# Patient Record
Sex: FEMALE | Race: WHITE | NOT HISPANIC OR LATINO | Employment: PART TIME | URBAN - NONMETROPOLITAN AREA
[De-identification: names, ages, dates, MRNs, and addresses within clinical notes are randomized per-mention and may not be internally consistent; named-entity substitution may affect disease eponyms.]

---
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
## 2023-05-03 ENCOUNTER — TELEPHONE (OUTPATIENT)
Dept: OBSTETRICS AND GYNECOLOGY | Facility: CLINIC | Age: 18
End: 2023-05-03

## 2023-05-03 DIAGNOSIS — R19.4 BOWEL HABIT CHANGES: Primary | ICD-10-CM

## 2023-05-03 NOTE — TELEPHONE ENCOUNTER
Caller: Bryant Radhika    Relationship: Self    Best call back number: [phone]    What is the medical concern/diagnosis: IBS    What specialty or service is being requested:GASTROLOIST    What is the provider, practice or medical service name: DENISE ASHFORD//[facility]    What is the office location: [address] [place] [address] [address]    What is the office phone number: [phone]    Any additional details: WAS DIAGNOSED WITH IBS IN 2020 LAST TIME SEEN HER PROVIDER WAS 2021 AND HAS MOVED AWAY FROM THAT PROVIDER AND NEEDS A LOCAL PROVIDER TO GET HER MEDICATION REFILLED//HAS AN APPOINTMENT 6/14- THE OFFICE STATES THAT SHE NEEDS A REFERRAL TO KEEP THIS APPOINTMENT//PLEASE FOLLOW UP IF ANY ISSUES

## 2023-05-09 ENCOUNTER — OFFICE VISIT (OUTPATIENT)
Dept: GASTROENTEROLOGY | Facility: CLINIC | Age: 18
End: 2023-05-09

## 2023-05-09 VITALS
DIASTOLIC BLOOD PRESSURE: 76 MMHG | OXYGEN SATURATION: 98 % | HEART RATE: 116 BPM | BODY MASS INDEX: 37.69 KG/M2 | SYSTOLIC BLOOD PRESSURE: 112 MMHG | WEIGHT: 192 LBS | HEIGHT: 60 IN | TEMPERATURE: 97.3 F

## 2023-05-09 DIAGNOSIS — K58.1 IRRITABLE BOWEL SYNDROME WITH CONSTIPATION: Primary | ICD-10-CM

## 2023-05-09 PROBLEM — R10.9 ABDOMINAL PAIN: Status: ACTIVE | Noted: 2023-05-09

## 2023-05-09 RX ORDER — DICYCLOMINE HYDROCHLORIDE 10 MG/1
10 CAPSULE ORAL
Qty: 360 CAPSULE | Refills: 3 | Status: SHIPPED | OUTPATIENT
Start: 2023-05-09

## 2023-05-09 NOTE — PROGRESS NOTES
Primary Physician: Remy Odell MD    Chief Complaint   Patient presents with   • Irritable Bowel Syndrome     Pt presents today to establish with GI-was seeing [facility] because she was a pediatric pt; Pt has been on Bentyl and states that does keep her IBS under control for the most part-does have to take more Bentyl in the summer because the heat seems to bother her IBS; Pt would like refill on the Bentyl        Subjective     Radhika Bryant is a 17 y.o. female.    HPI   IBS  Patient referred to our office for purposes of establishing herself as a new pt with our GI office for irritable bowel syndrome.  Previously followed by [facility].  Chart review revealed notes that she has been complaining of abdominal pain suspected to be irritable bowel since at least September 2020.  It was at that time she was recommended fiber therapy and began on Bentyl 10 mg.  This has worked well for her.  At age 15 she was diagnosed wtih IBS predominant constipation associated with cramping.  No family hx IBD. No family hx colon cancer.    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    Today, she describes her IBS is stable.  Feels like her symptoms are worse during summer. Using Bentyl 10mg before meals and at bedtime.  No blood per rectum. No weight loss. No abdominal pain. No previous colonoscopy exams.      Past Medical History:   Diagnosis Date   • Asthma    • IBS (irritable bowel syndrome)        Past Surgical History:   Procedure Laterality Date   • WISDOM TOOTH EXTRACTION          Current Outpatient Medications:   •  dicyclomine (BENTYL) 10 MG capsule, Take 1 capsule by mouth 4 (Four) Times a Day Before Meals & at Bedtime., Disp: 360 capsule, Rfl: 3  •  levonorgestrel (Mirena, 52 MG,) 20 MCG/24HR IUD, 1 each by Intrauterine route 1 (One) Time for 1 dose., Disp: 1 each, Rfl: 0  •  Wheat Dextrin (BENEFIBER PO), Take 1 capsule by mouth As Needed., Disp: , Rfl:     No  "Known Allergies    Social History     Socioeconomic History   • Marital status:    Tobacco Use   • Smoking status: Never   • Smokeless tobacco: Never   Vaping Use   • Vaping Use: Never used   Substance and Sexual Activity   • Alcohol use: Never   • Drug use: Never   • Sexual activity: Yes     Partners: Male     Birth control/protection: I.U.D.       Family History   Problem Relation Age of Onset   • Diabetes Father    • Irritable bowel syndrome Father    • Diabetes Maternal Grandmother    • Colon cancer Neg Hx    • Colon polyps Neg Hx    • Esophageal cancer Neg Hx    • Liver cancer Neg Hx    • Stomach cancer Neg Hx    • Rectal cancer Neg Hx    • Liver disease Neg Hx        Review of Systems   Constitutional: Negative for unexpected weight change.   Gastrointestinal: Negative for abdominal pain.       Objective     /76 (BP Location: Left arm, Patient Position: Sitting, Cuff Size: Adult)   Pulse 116   Temp 97.3 °F (36.3 °C) (Infrared)   Ht 152.4 cm (60\")   Wt 87.1 kg (192 lb)   SpO2 98%   Breastfeeding No   BMI 37.50 kg/m²     Physical Exam  Vitals reviewed.   Constitutional:       Appearance: Normal appearance.   Neurological:      Mental Status: She is alert.             Lab Results - Last 18 Months   Lab Units 10/12/22  1110   TSH uIU/mL 1.430              IMPRESSION/PLAN:    Assessment & Plan      Problem List Items Addressed This Visit        Gastrointestinal Abdominal     Irritable bowel syndrome with constipation - Primary    Relevant Medications    dicyclomine (BENTYL) 10 MG capsule     Follow up yearly  Continue Bentyl and fiber therapy        Follow Up 1 yearly      Alivia Sanchez, APRN  05/09/23  14:33 CDT    Part of this note may be an electronic transcription/translation of spoken language to printed text.    "

## 2023-10-16 ENCOUNTER — OFFICE VISIT (OUTPATIENT)
Dept: OBSTETRICS AND GYNECOLOGY | Facility: CLINIC | Age: 18
End: 2023-10-16

## 2023-10-16 VITALS
SYSTOLIC BLOOD PRESSURE: 102 MMHG | WEIGHT: 199 LBS | BODY MASS INDEX: 39.07 KG/M2 | DIASTOLIC BLOOD PRESSURE: 76 MMHG | HEIGHT: 60 IN

## 2023-10-16 DIAGNOSIS — N92.6 MISSED PERIOD: Primary | ICD-10-CM

## 2023-10-16 DIAGNOSIS — Z11.3 SCREENING FOR STD (SEXUALLY TRANSMITTED DISEASE): ICD-10-CM

## 2023-10-16 NOTE — PROGRESS NOTES
Chief Complaint  Med Management (Pt is here for a yearly birth control follow up.  Pt states that she has had periods the whole time she has had her mirena but is now 11 days late for this period and has had some ovary pain.  //Mirena was placed 2/16/22.  )    Subjective          Radhika Bryant presents to [facility]    History of Present Illness  The patient's pregnancy test is negative.  She states that it has been the first time that she missed her menstrual cycle since having the intrauterine device for 2 years.  When she is on her menstrual cycle, she will usually have just spotting for a few days.  Since she had the intrauterine device, she usually will have spotting for 3 days and will have heavy bleeding for the next 3 days.  She only needed to wear liners for those 3 days.  She mentions that her sister was recently diagnosed with hypothyroidism.  She has been more worried of having severe cramps than missing her menstrual cycle.  At one point, she had severe pain in her entire stomach.  She denies having intense cramping with her menstrual cycle.  When she is ovulating, she notes that she will feel some type of ovarian pain.  She reports that the pain this time is more amplified.  She agrees to undergo an ultrasound to look at her uterus and ovaries.  She agrees to do basic sexually transmitted disease testing.    The patient denies having any problems with vaginal itching, odor, dryness, or discharge.    In the last year, the patient has gained 24 pounds.  She reports that she has been working from home until 08/2023.  She notes that she has not been moving as much, which she feels has contributed to her weight gain.    Review of Systems   Constitutional:  Negative for activity change, appetite change, fatigue and fever.        Wt gain     Respiratory:  Negative for apnea and shortness of breath.    Cardiovascular:  Negative for chest pain and palpitations.   Gastrointestinal:  Negative  "for abdominal distention, abdominal pain, constipation, diarrhea, nausea and vomiting.   Endocrine: Negative for cold intolerance and heat intolerance.   Genitourinary:  Positive for menstrual problem (cramping). Negative for difficulty urinating, frequency, pelvic pain, vaginal discharge and vaginal pain.   Neurological:  Negative for headaches.   Psychiatric/Behavioral:  Negative for agitation and sleep disturbance.          Objective   Vital Signs:   /76   Ht 152.4 cm (60\")   Wt 90.3 kg (199 lb)   BMI 38.86 kg/m²     Physical Exam  Vitals reviewed.   Constitutional:       Appearance: She is well-developed.   Eyes:      General:         Right eye: No discharge.         Left eye: No discharge.   Cardiovascular:      Rate and Rhythm: Normal rate and regular rhythm.   Pulmonary:      Effort: Pulmonary effort is normal.      Breath sounds: Normal breath sounds.   Skin:     General: Skin is warm.   Neurological:      Mental Status: She is alert and oriented to person, place, and time.   Psychiatric:         Behavior: Behavior normal.         Thought Content: Thought content normal.         Judgment: Judgment normal.         Result Review :                       Assessment and Plan      Discussed pt bleeding change.   UPT negative  Labs to evaluate Free T4, TSH and CBC.     Urine sent for Chlamydia/Gonorrhea/Trichomonas testing.     Pt to return for US and OV.       Diagnoses and all orders for this visit:    1. Missed period (Primary)  -     POC Pregnancy, Urine  -     T4, Free  -     TSH  -     CBC & Differential    2. Screening for STD (sexually transmitted disease)  -     Chlamydia trachomatis, Neisseria gonorrhoeae, Trichomonas vaginalis, PCR - Urine, Urine, Clean Catch                  Follow Up   Return for US and OV.    Patient was given instructions and counseling regarding her condition or for health maintenance advice. Please see specific information pulled into the AVS if appropriate. "     Transcribed from ambient dictation for DENISE Waddell by Vlad Rizzo.  10/16/23   15:39 CDT    Patient or patient representative verbalized consent to the visit recording.  I have personally performed the services described in this document as transcribed by the above individual, and it is both accurate and complete.

## 2023-10-17 LAB
BASOPHILS # BLD AUTO: 0.03 10*3/MM3 (ref 0–0.2)
BASOPHILS NFR BLD AUTO: 0.6 % (ref 0–1.5)
EOSINOPHIL # BLD AUTO: 0.16 10*3/MM3 (ref 0–0.4)
EOSINOPHIL NFR BLD AUTO: 3.2 % (ref 0.3–6.2)
ERYTHROCYTE [DISTWIDTH] IN BLOOD BY AUTOMATED COUNT: 11.8 % (ref 12.3–15.4)
HCT VFR BLD AUTO: 41.5 % (ref 34–46.6)
HGB BLD-MCNC: 14 G/DL (ref 12–15.9)
IMM GRANULOCYTES # BLD AUTO: 0.01 10*3/MM3 (ref 0–0.05)
IMM GRANULOCYTES NFR BLD AUTO: 0.2 % (ref 0–0.5)
LYMPHOCYTES # BLD AUTO: 1.82 10*3/MM3 (ref 0.7–3.1)
LYMPHOCYTES NFR BLD AUTO: 36 % (ref 19.6–45.3)
MCH RBC QN AUTO: 28.2 PG (ref 26.6–33)
MCHC RBC AUTO-ENTMCNC: 33.7 G/DL (ref 31.5–35.7)
MCV RBC AUTO: 83.5 FL (ref 79–97)
MONOCYTES # BLD AUTO: 0.48 10*3/MM3 (ref 0.1–0.9)
MONOCYTES NFR BLD AUTO: 9.5 % (ref 5–12)
NEUTROPHILS # BLD AUTO: 2.56 10*3/MM3 (ref 1.7–7)
NEUTROPHILS NFR BLD AUTO: 50.5 % (ref 42.7–76)
NRBC BLD AUTO-RTO: 0 /100 WBC (ref 0–0.2)
PLATELET # BLD AUTO: 348 10*3/MM3 (ref 140–450)
RBC # BLD AUTO: 4.97 10*6/MM3 (ref 3.77–5.28)
T4 FREE SERPL-MCNC: 1.11 NG/DL (ref 0.93–1.7)
TSH SERPL DL<=0.005 MIU/L-ACNC: 1.6 UIU/ML (ref 0.27–4.2)
WBC # BLD AUTO: 5.06 10*3/MM3 (ref 3.4–10.8)

## 2023-10-18 ENCOUNTER — OFFICE VISIT (OUTPATIENT)
Dept: OBSTETRICS AND GYNECOLOGY | Facility: CLINIC | Age: 18
End: 2023-10-18

## 2023-10-18 VITALS
HEIGHT: 60 IN | WEIGHT: 197 LBS | BODY MASS INDEX: 38.68 KG/M2 | DIASTOLIC BLOOD PRESSURE: 80 MMHG | SYSTOLIC BLOOD PRESSURE: 124 MMHG

## 2023-10-18 DIAGNOSIS — Z30.49 ENCOUNTER FOR SURVEILLANCE OF OTHER CONTRACEPTIVE: Primary | ICD-10-CM

## 2023-10-18 DIAGNOSIS — E66.9 OBESITY (BMI 30-39.9): ICD-10-CM

## 2023-10-18 DIAGNOSIS — N92.6 MISSED PERIOD: ICD-10-CM

## 2023-10-18 LAB
C TRACH RRNA SPEC QL NAA+PROBE: NEGATIVE
N GONORRHOEA RRNA SPEC QL NAA+PROBE: NEGATIVE
T VAGINALIS RRNA SPEC QL NAA+PROBE: NEGATIVE

## 2023-10-18 NOTE — PATIENT INSTRUCTIONS

## 2023-10-18 NOTE — PROGRESS NOTES
"Chief Complaint  Gynecologic Exam (Pt is here for a f/u with US to check IUD placement and check ovaries.  Pt has no other complaints today. )    Subjective          Radhika Bryant presents to [facility]    History of Present Illness  The patient presents for concern of late menstruation.  Her previous pregnancy test was negative.    Review of Systems   Constitutional:  Negative for activity change, appetite change, fatigue and fever.   Respiratory:  Negative for apnea and shortness of breath.    Cardiovascular:  Negative for chest pain and palpitations.   Gastrointestinal:  Negative for abdominal distention, abdominal pain, constipation, diarrhea, nausea and vomiting.   Endocrine: Negative for cold intolerance and heat intolerance.   Genitourinary:  Positive for menstrual problem (late period). Negative for difficulty urinating, frequency, pelvic pain, vaginal discharge and vaginal pain.   Neurological:  Negative for headaches.   Psychiatric/Behavioral:  Negative for agitation and sleep disturbance.          Objective   Vital Signs:   /80   Ht 152.4 cm (60\")   Wt 89.4 kg (197 lb)   BMI 38.47 kg/m²     Physical Exam  Vitals reviewed.   Constitutional:       Appearance: She is well-developed.   Eyes:      General:         Right eye: No discharge.         Left eye: No discharge.   Cardiovascular:      Rate and Rhythm: Normal rate and regular rhythm.   Pulmonary:      Effort: Pulmonary effort is normal.      Breath sounds: Normal breath sounds.   Skin:     General: Skin is warm.   Neurological:      Mental Status: She is alert and oriented to person, place, and time.   Psychiatric:         Behavior: Behavior normal.         Thought Content: Thought content normal.         Judgment: Judgment normal.         Result Review :   The following data was reviewed by: DENISE Waddell on 10/18/2023:  OBGYN Diagnostics Review:   HCG, Urine, QL   Date Value Ref Range Status   10/16/2023 " Negative Negative Final      Chlamydia trachomatis, NURIS   Date Value Ref Range Status   10/16/2023 Negative Negative Final     Gonococcus by NURIS   Date Value Ref Range Status   10/16/2023 Negative Negative Final      TSH          10/16/2023    10:16   TSH   TSH 1.600      Data reviewed : Radiologic studies transvaginal US                 Assessment and Plan      Reviewed US report and discussed with pt.   US report indicates 5.82 cm, ovaries appear normal, IUD appears normal.     Discussed pt test results.   Pt advised to call with any questions or concerns.   Discussed S&S to report. Pt voiced understanding.       Diagnoses and all orders for this visit:    1. Encounter for surveillance of other contraceptive (Primary)    2. Obesity (BMI 30-39.9)    3. Missed period                  Follow Up   Return for Annual physical.    Patient was given instructions and counseling regarding her condition or for health maintenance advice. Please see specific information pulled into the AVS if appropriate.     Transcribed from ambient dictation for DENISE Waddell by Vlad Rizzo.  10/18/23   14:20 CDT    Patient or patient representative verbalized consent to the visit recording.  I have personally performed the services described in this document as transcribed by the above individual, and it is both accurate and complete.

## 2023-10-20 NOTE — PATIENT INSTRUCTIONS

## 2023-11-04 ENCOUNTER — HOSPITAL ENCOUNTER (EMERGENCY)
Facility: HOSPITAL | Age: 18
Discharge: HOME | End: 2023-11-04

## 2023-11-04 VITALS
DIASTOLIC BLOOD PRESSURE: 83 MMHG | TEMPERATURE: 98.5 F | HEART RATE: 117 BPM | OXYGEN SATURATION: 97 % | WEIGHT: 190 LBS | BODY MASS INDEX: 37.3 KG/M2 | SYSTOLIC BLOOD PRESSURE: 143 MMHG | HEIGHT: 60 IN | RESPIRATION RATE: 20 BRPM

## 2023-11-04 DIAGNOSIS — L03.213 PRESEPTAL CELLULITIS OF RIGHT EYE: Primary | ICD-10-CM

## 2023-11-04 PROCEDURE — 96372 THER/PROPH/DIAG INJ SC/IM: CPT

## 2023-11-04 PROCEDURE — 25010000002 DEXAMETHASONE PER 1 MG

## 2023-11-04 PROCEDURE — 63710000001 DIPHENHYDRAMINE PER 50 MG

## 2023-11-04 PROCEDURE — 99283 EMERGENCY DEPT VISIT LOW MDM: CPT

## 2023-11-04 RX ORDER — DEXAMETHASONE 4 MG/1
4 TABLET ORAL 3 TIMES DAILY
Qty: 9 TABLET | Refills: 0 | Status: SHIPPED | OUTPATIENT
Start: 2023-11-04 | End: 2023-11-07

## 2023-11-04 RX ORDER — AMOXICILLIN AND CLAVULANATE POTASSIUM 875; 125 MG/1; MG/1
1 TABLET, FILM COATED ORAL 2 TIMES DAILY
Qty: 14 TABLET | Refills: 0 | Status: SHIPPED | OUTPATIENT
Start: 2023-11-04 | End: 2023-11-11

## 2023-11-04 RX ORDER — DIPHENHYDRAMINE HCL 25 MG
25 CAPSULE ORAL ONCE
Status: COMPLETED | OUTPATIENT
Start: 2023-11-04 | End: 2023-11-04

## 2023-11-04 RX ORDER — DEXAMETHASONE SODIUM PHOSPHATE 10 MG/ML
10 INJECTION INTRAMUSCULAR; INTRAVENOUS ONCE
Status: COMPLETED | OUTPATIENT
Start: 2023-11-04 | End: 2023-11-04

## 2023-11-04 RX ADMIN — DIPHENHYDRAMINE HYDROCHLORIDE 25 MG: 25 CAPSULE ORAL at 01:04

## 2023-11-04 RX ADMIN — DEXAMETHASONE SODIUM PHOSPHATE 10 MG: 10 INJECTION INTRAMUSCULAR; INTRAVENOUS at 01:05

## 2023-11-04 NOTE — DISCHARGE INSTRUCTIONS
Today you are seen in the ER for your symptoms.  This is likely preseptal cellulitis.  Antibiotics, steroids and antibiotic/steroid ointment were prescribed.  Please follow-up with primary care provider as soon as possible reassessments.  Please follow-up with ophthalmology as well.  Please return the emergency department for any new or worsening symptoms like we discussed such as pain with movement or worsening swelling.

## 2023-11-04 NOTE — ED PROVIDER NOTES
Subjective   History of Present Illness  Patient is a 18-year-old female who presents emergency department with complaints of eye problem.  States that she was asleep and she woke up at her right eye was itchy and watery.  She went to the bathroom and noticed that her right eye was swollen.  She denies any injury to her eye.  Denies any fevers recently.  She states that she is able to see, but it is blurry because it is watery.  She denies any pain to her eye.        Review of Systems   HENT:  Positive for facial swelling.    Eyes:  Positive for discharge, redness and itching.   All other systems reviewed and are negative.      Past Medical History:   Diagnosis Date    Asthma     IBS (irritable bowel syndrome)        No Known Allergies    Past Surgical History:   Procedure Laterality Date    WISDOM TOOTH EXTRACTION         Family History   Problem Relation Age of Onset    Diabetes Father     Irritable bowel syndrome Father     Diabetes Maternal Grandmother     Diabetes Paternal Grandmother     Diabetes Paternal Uncle     Diabetes Paternal Uncle     Colon cancer Neg Hx     Colon polyps Neg Hx     Esophageal cancer Neg Hx     Liver cancer Neg Hx     Stomach cancer Neg Hx     Rectal cancer Neg Hx     Liver disease Neg Hx        Social History     Socioeconomic History    Marital status:    Tobacco Use    Smoking status: Never    Smokeless tobacco: Never   Vaping Use    Vaping Use: Never used   Substance and Sexual Activity    Alcohol use: Never    Drug use: Never    Sexual activity: Yes     Partners: Male     Birth control/protection: I.U.D.           Objective   Physical Exam  Vitals and nursing note reviewed.   Constitutional:       General: She is not in acute distress.     Appearance: Normal appearance. She is normal weight. She is not ill-appearing or toxic-appearing.   HENT:      Head: Normocephalic.      Nose: Nose normal.   Eyes:      General:         Right eye: Discharge (Watery) present.       Extraocular Movements: Extraocular movements intact.      Conjunctiva/sclera: Conjunctivae normal.      Pupils: Pupils are equal, round, and reactive to light.      Comments: Redness noted to right sclera.  There is swelling noted around the right eye.   Cardiovascular:      Rate and Rhythm: Normal rate and regular rhythm.      Pulses: Normal pulses.      Heart sounds: Normal heart sounds.   Pulmonary:      Effort: Pulmonary effort is normal.      Breath sounds: Normal breath sounds.   Abdominal:      General: Abdomen is flat. Bowel sounds are normal.      Palpations: Abdomen is soft.   Musculoskeletal:         General: Normal range of motion.      Cervical back: Normal range of motion and neck supple.   Skin:     General: Skin is warm and dry.   Neurological:      General: No focal deficit present.      Mental Status: She is alert and oriented to person, place, and time. Mental status is at baseline.   Psychiatric:         Mood and Affect: Mood normal.         Behavior: Behavior normal.         Thought Content: Thought content normal.         Judgment: Judgment normal.         Procedures           ED Course                                           Medical Decision Making  Radhika Bryant is a very pleasant 18 y.o. female who presents to the emergency department for eye problem.     Patient was non-toxic and not-ill appearing on arrival. Vital signs stable.     Patient's presentation raises suspicion for differentials including, but not limited to, preseptal cellulitis, conjunctivitis, corneal abrasion.     External (non-ED) record review: None    Radhika was given IM Decadron and Benadryl for symptomatic relief.  Patient passed visual acuity screen.    On re-evaluation, patient remained hemodynamically stable and appeared to be comfortable and in no acute distress.    Given findings described above, patient's presentation is most likely related to preseptal cellulitis.  Steroids, antibiotics, TobraDex ointment were  prescribed at discharge.  Advised patient to watch for worsening signs such as worsening swelling, eye pain with movement, vision changes.  Advised patient to follow-up with ophthalmology as soon as possible to reassess symptoms. I answered all the questions regarding the emergency department evaluation, diagnosis, and treatment plan. We talked about how crucial it is for the patient to follow up by calling their primary care provider as soon as possible to schedule an appointment for within the next few days or as soon as possible so that the symptoms can be reassessed to see if they have improved or to answer any additional questions. I also provided the patient with advice on returning safely and urged the patient to visit the emergency department right away if any worsening or new symptoms appeared. The patient verbalized understanding of the discharge instructions and agreed with them. Radhika was discharged in stable condition.    Signed by:   DENISE Gipson 11/4/2023 01:04 CDT     Dragon disclaimer:  Part of this note may be an electronic transcription/translation of spoken language to printed text using the Dragon Dictation System.    Problems Addressed:  Preseptal cellulitis of right eye: complicated acute illness or injury    Risk  Prescription drug management.        Final diagnoses:   Preseptal cellulitis of right eye       ED Disposition  ED Disposition       ED Disposition   Discharge    Condition   Stable    Comment   --               Remy Odell MD  [address] [place] [address] [id number]  [facility] [address]  [phone]    Schedule an appointment as soon as possible for a visit in 1 day      [facility] EMERGENCY DEPARTMENT  [address] [place] [id number] [phone]  Go to   If symptoms worsen    Gaurav Dick MD  [address] [facility] [address]  [phone]    Schedule an appointment as soon as possible for a visit in 1 day           Medication List         New Prescriptions      amoxicillin-clavulanate 875-125 MG per tablet  Commonly known as: AUGMENTIN  Take 1 tablet by mouth 2 (Two) Times a Day for 7 days.     dexAMETHasone 4 MG tablet  Commonly known as: DECADRON  Take 1 tablet by mouth 3 (Three) Times a Day for 3 days.     TobraDex 0.3-0.1 % ophthalmic ointment  Generic drug: tobramycin-dexAMETHasone  Administer  to the right eye 3 (Three) Times a Day.               Where to Get Your Medications        These medications were sent to [facility] - [place], [place] - [address] [place] [address] - [phone]  - [phone] [address] [address]      Phone: [phone]   amoxicillin-clavulanate 875-125 MG per tablet  dexAMETHasone 4 MG tablet  TobraDex 0.3-0.1 % ophthalmic ointment            Morenita Givens, DENISE  11/04/23 0106

## 2023-11-06 ENCOUNTER — NURSE TRIAGE (OUTPATIENT)
Dept: CALL CENTER | Facility: HOSPITAL | Age: 18
End: 2023-11-06

## 2023-11-06 NOTE — TELEPHONE ENCOUNTER
"Caller needing to speak with Billing department re: financial assistance for ED visit 11/04/2023.     Call transferred to [facility] billing department.    Reason for Disposition   Information only question and nurse able to answer    Additional Information   Negative: Nursing judgment   Negative: Nursing judgment   Negative: Nursing judgment   Negative: Nursing judgment    Answer Assessment - Initial Assessment Questions  1. REASON FOR CALL or QUESTION: \"What is your reason for calling today?\" or \"How can I best help you?\" or \"What question do you have that I can help answer?\"      Needs to speak with someone regarding financial assistance for ED visit on 11/04/2023    Protocols used: No Protocol Available - Information Only-ADULT-[place]    "

## 2024-02-28 ENCOUNTER — OFFICE VISIT (OUTPATIENT)
Dept: OBSTETRICS AND GYNECOLOGY | Age: 19
End: 2024-02-28
Payer: MEDICAID

## 2024-02-28 VITALS
BODY MASS INDEX: 41.23 KG/M2 | SYSTOLIC BLOOD PRESSURE: 124 MMHG | WEIGHT: 210 LBS | HEIGHT: 60 IN | DIASTOLIC BLOOD PRESSURE: 70 MMHG

## 2024-02-28 DIAGNOSIS — N92.6 MISSED PERIOD: Primary | ICD-10-CM

## 2024-02-28 DIAGNOSIS — L73.9 HAIR FOLLICLE INFECTION: ICD-10-CM

## 2024-02-28 PROCEDURE — 99214 OFFICE O/P EST MOD 30 MIN: CPT | Performed by: NURSE PRACTITIONER

## 2024-02-28 PROCEDURE — 99459 PELVIC EXAMINATION: CPT | Performed by: NURSE PRACTITIONER

## 2024-02-28 PROCEDURE — 81025 URINE PREGNANCY TEST: CPT | Performed by: NURSE PRACTITIONER

## 2024-02-28 RX ORDER — SULFAMETHOXAZOLE AND TRIMETHOPRIM 800; 160 MG/1; MG/1
1 TABLET ORAL 2 TIMES DAILY
Qty: 14 TABLET | Refills: 0 | Status: SHIPPED | OUTPATIENT
Start: 2024-02-28

## 2024-03-01 NOTE — PROGRESS NOTES
Chief Complaint   Patient presents with    vaginal lump     Patient here for vaginal lump. Patient reports lump being on the left side. Patient reports noticing lump on Sunday night.     Menstrual Problem     Patient is late on her period for this month. She reports having normal periods usually even with the IUD. Patient reports taking a pregnancy test 2 weeks ago and it was negative.        History:  Radhika Bryant is a 18 y.o. female who presents today for follow-up for evaluation of the above:  Pt comes in today with complaints of bump on left labia. Symptoms have improved. First noticed on Sunday.   Also had another late period with IUD. She has had this checked into previously but wanted to make sure it was okay.         ROS:  Review of Systems   Constitutional:  Negative for activity change and unexpected weight loss.   Cardiovascular:  Negative for chest pain.   Gastrointestinal:  Negative for blood in stool, constipation and diarrhea.   Endocrine: Negative for cold intolerance and heat intolerance.   Genitourinary:  Positive for menstrual problem. Negative for dyspareunia, pelvic pain and vaginal discharge.   Musculoskeletal:  Negative for arthralgias, back pain, neck pain and neck stiffness.   Skin:  Positive for skin lesions. Negative for rash.   Neurological:  Negative for dizziness and headache.   Psychiatric/Behavioral:  Negative for sleep disturbance. The patient is not nervous/anxious.        Ms. Bryant  reports that she has never smoked. She has never used smokeless tobacco. She reports that she does not drink alcohol and does not use drugs.      Current Outpatient Medications:     dicyclomine (BENTYL) 10 MG capsule, Take 1 capsule by mouth 4 (Four) Times a Day Before Meals & at Bedtime., Disp: 360 capsule, Rfl: 3    levonorgestrel (Mirena, 52 MG,) 20 MCG/24HR IUD, 1 each by Intrauterine route 1 (One) Time for 1 dose., Disp: 1 each, Rfl: 0    Wheat Dextrin (BENEFIBER PO), Take 1 capsule by mouth As  "Needed., Disp: , Rfl:     sulfamethoxazole-trimethoprim (Bactrim DS) 800-160 MG per tablet, Take 1 tablet by mouth 2 (Two) Times a Day., Disp: 14 tablet, Rfl: 0      OBJECTIVE:  /70 (BP Location: Left arm, Patient Position: Sitting, Cuff Size: Adult)   Ht 152.4 cm (60\")   Wt 95.3 kg (210 lb)   LMP 01/01/2024   BMI 41.01 kg/m²    Physical Exam  Vitals and nursing note reviewed. Exam conducted with a chaperone present.   Constitutional:       Appearance: She is well-developed.   HENT:      Head: Normocephalic and atraumatic.   Cardiovascular:      Rate and Rhythm: Normal rate and regular rhythm.   Abdominal:      General: Bowel sounds are normal. There is no distension.      Palpations: Abdomen is soft.      Tenderness: There is no abdominal tenderness.      Hernia: There is no hernia in the left inguinal area.   Genitourinary:     Labia:         Right: No rash, tenderness or lesion.         Left: Tenderness and lesion present. No rash.        Skin:     General: Skin is warm and dry.   Neurological:      Mental Status: She is alert and oriented to person, place, and time.   Psychiatric:         Behavior: Behavior normal.         Thought Content: Thought content normal.         Judgment: Judgment normal.         Assessment/Plan    Diagnoses and all orders for this visit:    1. Missed period (Primary)  -     POC Pregnancy, Urine    2. Hair follicle infection  -     sulfamethoxazole-trimethoprim (Bactrim DS) 800-160 MG per tablet; Take 1 tablet by mouth 2 (Two) Times a Day.  Dispense: 14 tablet; Refill: 0    Pt comes in today with complaints of bump on left labia that started over weekend. It was getting bigger, but now she states it has improved.  On exam, this is more suspicious for resolving infected hair follicle. No significant induration noted. Minimal erythema. No drainage. Will cover as above. Pt to return with continuing or worsening symptoms.  Pt also complains of late period again. This has been " going on with her IUD. She does understand that this can be normal, but it always makes her nervous. UPT was negative. Pt has had ultrasound in recent past for same thing that showed normal placement. Pt currently declines another ultrasound. Return with worsening symptoms.        An After Visit Summary was printed and given to the patient at discharge.  Return for Next scheduled follow up. Sooner if problems arise.          DENISE Shaver  Electronically Signed

## 2024-07-17 ENCOUNTER — OFFICE VISIT (OUTPATIENT)
Dept: GASTROENTEROLOGY | Facility: CLINIC | Age: 19
End: 2024-07-17

## 2024-07-17 VITALS
SYSTOLIC BLOOD PRESSURE: 122 MMHG | TEMPERATURE: 97.4 F | BODY MASS INDEX: 42.41 KG/M2 | DIASTOLIC BLOOD PRESSURE: 72 MMHG | OXYGEN SATURATION: 100 % | HEART RATE: 110 BPM | WEIGHT: 216 LBS | HEIGHT: 60 IN

## 2024-07-17 DIAGNOSIS — K21.9 GASTROESOPHAGEAL REFLUX DISEASE, UNSPECIFIED WHETHER ESOPHAGITIS PRESENT: ICD-10-CM

## 2024-07-17 DIAGNOSIS — K58.1 IRRITABLE BOWEL SYNDROME WITH CONSTIPATION: ICD-10-CM

## 2024-07-17 DIAGNOSIS — R11.0 NAUSEA: Primary | ICD-10-CM

## 2024-07-17 RX ORDER — DICYCLOMINE HYDROCHLORIDE 10 MG/1
10 CAPSULE ORAL
Qty: 360 CAPSULE | Refills: 3 | Status: SHIPPED | OUTPATIENT
Start: 2024-07-17

## 2024-07-17 RX ORDER — AZELASTINE HYDROCHLORIDE, FLUTICASONE PROPIONATE 137; 50 UG/1; UG/1
1 SPRAY, METERED NASAL DAILY
COMMUNITY
Start: 2024-03-01

## 2024-07-17 RX ORDER — OMEPRAZOLE 40 MG/1
40 CAPSULE, DELAYED RELEASE ORAL DAILY
Qty: 30 CAPSULE | Refills: 1 | Status: SHIPPED | OUTPATIENT
Start: 2024-07-17

## 2024-07-17 RX ORDER — FEXOFENADINE HCL 180 MG/1
180 TABLET ORAL DAILY
COMMUNITY
Start: 2024-03-01

## 2024-07-17 NOTE — PROGRESS NOTES
Primary Physician: Provider, No Known    Chief Complaint   Patient presents with    Follow-up     Pt presents today for yearly OV for IBS-states she doesn't feel like her Bentyl is working anymore-is having a lot of nausea in the mornings and a lot of abdominal cramping for the last 2 months-also states she's had an increase in reflux       Subjective     Radhika Bryant is a 19 y.o. female.    HPI  IBS  Patient initially seen May 2023 for IBS.  Previously followed by [facility].  Patient was taking Bentyl before meals and at bedtime.  No previous colonoscopy examination.    She comes in today with complaints of slight change in bowel habits.  She may be a bit more constipated.  Again she is using Bentyl regularly daily.  Usually does have a BM daily.    Nausea  Having frequent nausea. Worse in the morning hours.  It has been persistent for past couple months.  She does have the Mirena IUD for birth control and she took pregnancy test last month which was negative.  No NSAIDS.    GERD  Increased acid reflux a couple weeks ago. At this time is has subsided.  No dysphagia.  No melena.      Past Medical History:   Diagnosis Date    Asthma     IBS (irritable bowel syndrome)        Past Surgical History:   Procedure Laterality Date    WISDOM TOOTH EXTRACTION          Current Outpatient Medications:     Azelastine-Fluticasone 137-50 MCG/ACT suspension, Inhale 1 spray Daily., Disp: , Rfl:     dicyclomine (BENTYL) 10 MG capsule, Take 1 capsule by mouth 4 (Four) Times a Day Before Meals & at Bedtime., Disp: 360 capsule, Rfl: 3    fexofenadine (Allegra Allergy) 180 MG tablet, Take 1 tablet by mouth Daily., Disp: , Rfl:     levonorgestrel (Mirena, 52 MG,) 20 MCG/24HR IUD, 1 each by Intrauterine route 1 (One) Time for 1 dose., Disp: 1 each, Rfl: 0    Wheat Dextrin (BENEFIBER PO), Take 1 capsule by mouth As Needed., Disp: , Rfl:     omeprazole (priLOSEC) 40 MG capsule, Take 1 capsule by mouth Daily., Disp: 30 capsule,  "Rfl: 1    sulfamethoxazole-trimethoprim (Bactrim DS) 800-160 MG per tablet, Take 1 tablet by mouth 2 (Two) Times a Day. (Patient not taking: Reported on 7/17/2024), Disp: 14 tablet, Rfl: 0    No Known Allergies    Social History     Socioeconomic History    Marital status:    Tobacco Use    Smoking status: Never    Smokeless tobacco: Never   Vaping Use    Vaping status: Never Used   Substance and Sexual Activity    Alcohol use: Never    Drug use: Never    Sexual activity: Yes     Partners: Male     Birth control/protection: I.U.D.       Family History   Problem Relation Age of Onset    Diabetes Father     Irritable bowel syndrome Father     Diabetes Maternal Grandmother     Diabetes Paternal Grandmother     Diabetes Paternal Uncle     Diabetes Paternal Uncle     Colon cancer Neg Hx     Colon polyps Neg Hx     Esophageal cancer Neg Hx     Liver cancer Neg Hx     Stomach cancer Neg Hx     Rectal cancer Neg Hx     Liver disease Neg Hx        Review of Systems   Constitutional:  Negative for unexpected weight change.   Gastrointestinal:  Positive for constipation and nausea.       Objective     /72 (BP Location: Left arm, Patient Position: Sitting, Cuff Size: Adult)   Pulse 110   Temp 97.4 °F (36.3 °C) (Infrared)   Ht 152.4 cm (60\")   Wt 98 kg (216 lb)   SpO2 100%   Breastfeeding No   BMI 42.18 kg/m²     Physical Exam  Vitals reviewed.   Constitutional:       Appearance: Normal appearance.   Cardiovascular:      Rate and Rhythm: Normal rate and regular rhythm.      Heart sounds: Normal heart sounds.   Pulmonary:      Effort: Pulmonary effort is normal.      Breath sounds: Normal breath sounds.   Neurological:      Mental Status: She is alert.           Lab Results - Last 18 Months   Lab Units 10/16/23  1016   HEMOGLOBIN g/dL 14.0   HEMATOCRIT % 41.5   MCV fL 83.5   WBC 10*3/mm3 5.06   RDW % 11.8*   PLATELETS 10*3/mm3 348       Lab Results - Last 18 Months   Lab Units 10/16/23  1016   TSH uIU/mL 1.600 "              IMPRESSION/PLAN:    Assessment & Plan      Problem List Items Addressed This Visit       Irritable bowel syndrome with constipation    Overview     Diagnosed in [facility] prior to seeing me May 2023.  No previous colonoscopy.  Self-pay patient.  Treated with Bentyl before meals         Relevant Medications    dicyclomine (BENTYL) 10 MG capsule    GERD (gastroesophageal reflux disease)    Overview     Increased acid reflux over the past month but the past 2 weeks she has been asymptomatic         Current Assessment & Plan     Adding omeprazole 40 mg daily  ..Pt is instructed to avoid caffeine, chocolate, peppermint and nicotine.  They are to avoid eating within 2-3 hours prior to bedtime.           Relevant Medications    omeprazole (priLOSEC) 40 MG capsule    dicyclomine (BENTYL) 10 MG capsule    Nausea - Primary    Overview     Nausea worse morning hours.  No NSAIDs.         Current Assessment & Plan     Begin omeprazole 40 mg once daily.  Beginning conservatively since she does not have insurance.  Otherwise I would like to consider endoscopy examination if we cannot get her symptoms under control.          6 weeks of Omeprazole  6-week follow-up                  Alivia Sanchez, APRN  07/18/24  15:24 CDT    Part of this note may be an electronic transcription/translation of spoken language to printed text.

## 2024-07-17 NOTE — ASSESSMENT & PLAN NOTE
Adding omeprazole 40 mg daily  ..Pt is instructed to avoid caffeine, chocolate, peppermint and nicotine.  They are to avoid eating within 2-3 hours prior to bedtime.

## 2024-07-18 NOTE — ASSESSMENT & PLAN NOTE
Begin omeprazole 40 mg once daily.  Beginning conservatively since she does not have insurance.  Otherwise I would like to consider endoscopy examination if we cannot get her symptoms under control.

## 2024-08-13 DIAGNOSIS — R11.0 NAUSEA: Primary | ICD-10-CM

## 2024-08-13 DIAGNOSIS — K21.9 GASTROESOPHAGEAL REFLUX DISEASE, UNSPECIFIED WHETHER ESOPHAGITIS PRESENT: ICD-10-CM

## 2024-08-19 ENCOUNTER — TELEPHONE (OUTPATIENT)
Dept: GASTROENTEROLOGY | Facility: CLINIC | Age: 19
End: 2024-08-19

## 2024-08-21 ENCOUNTER — HOSPITAL ENCOUNTER (OUTPATIENT)
Facility: HOSPITAL | Age: 19
Setting detail: HOSPITAL OUTPATIENT SURGERY
Discharge: HOME | End: 2024-08-21
Attending: INTERNAL MEDICINE | Admitting: INTERNAL MEDICINE

## 2024-08-21 ENCOUNTER — ANESTHESIA (OUTPATIENT)
Dept: GASTROENTEROLOGY | Facility: HOSPITAL | Age: 19
End: 2024-08-21

## 2024-08-21 ENCOUNTER — ANESTHESIA EVENT (OUTPATIENT)
Dept: GASTROENTEROLOGY | Facility: HOSPITAL | Age: 19
End: 2024-08-21

## 2024-08-21 VITALS
BODY MASS INDEX: 42.41 KG/M2 | HEART RATE: 93 BPM | TEMPERATURE: 97.4 F | SYSTOLIC BLOOD PRESSURE: 102 MMHG | DIASTOLIC BLOOD PRESSURE: 72 MMHG | HEIGHT: 60 IN | OXYGEN SATURATION: 95 % | RESPIRATION RATE: 21 BRPM | WEIGHT: 216 LBS

## 2024-08-21 DIAGNOSIS — K21.9 GASTROESOPHAGEAL REFLUX DISEASE, UNSPECIFIED WHETHER ESOPHAGITIS PRESENT: ICD-10-CM

## 2024-08-21 DIAGNOSIS — R11.0 NAUSEA: ICD-10-CM

## 2024-08-21 DIAGNOSIS — R10.13 EPIGASTRIC PAIN: Primary | ICD-10-CM

## 2024-08-21 LAB — B-HCG UR QL: NEGATIVE

## 2024-08-21 PROCEDURE — 25010000002 PROPOFOL 10 MG/ML EMULSION

## 2024-08-21 PROCEDURE — 25810000003 SODIUM CHLORIDE 0.9 % SOLUTION: Performed by: ANESTHESIOLOGY

## 2024-08-21 PROCEDURE — 43235 EGD DIAGNOSTIC BRUSH WASH: CPT | Performed by: INTERNAL MEDICINE

## 2024-08-21 PROCEDURE — 81025 URINE PREGNANCY TEST: CPT | Performed by: ANESTHESIOLOGY

## 2024-08-21 RX ORDER — PROPOFOL 10 MG/ML
VIAL (ML) INTRAVENOUS AS NEEDED
Status: DISCONTINUED | OUTPATIENT
Start: 2024-08-21 | End: 2024-08-21 | Stop reason: SURG

## 2024-08-21 RX ORDER — LIDOCAINE HYDROCHLORIDE 10 MG/ML
0.5 INJECTION, SOLUTION EPIDURAL; INFILTRATION; INTRACAUDAL; PERINEURAL ONCE AS NEEDED
Status: CANCELLED | OUTPATIENT
Start: 2024-08-21

## 2024-08-21 RX ORDER — LIDOCAINE HYDROCHLORIDE 20 MG/ML
INJECTION, SOLUTION EPIDURAL; INFILTRATION; INTRACAUDAL; PERINEURAL AS NEEDED
Status: DISCONTINUED | OUTPATIENT
Start: 2024-08-21 | End: 2024-08-21 | Stop reason: SURG

## 2024-08-21 RX ORDER — SODIUM CHLORIDE 9 MG/ML
500 INJECTION, SOLUTION INTRAVENOUS CONTINUOUS PRN
Status: DISCONTINUED | OUTPATIENT
Start: 2024-08-21 | End: 2024-08-21 | Stop reason: HOSPADM

## 2024-08-21 RX ORDER — SODIUM CHLORIDE 0.9 % (FLUSH) 0.9 %
10 SYRINGE (ML) INJECTION AS NEEDED
Status: DISCONTINUED | OUTPATIENT
Start: 2024-08-21 | End: 2024-08-21 | Stop reason: HOSPADM

## 2024-08-21 RX ADMIN — GLYCOPYRROLATE 0.1 MG: 0.2 INJECTION INTRAMUSCULAR; INTRAVENOUS at 11:02

## 2024-08-21 RX ADMIN — PROPOFOL 350 MG: 10 INJECTION, EMULSION INTRAVENOUS at 11:04

## 2024-08-21 RX ADMIN — SODIUM CHLORIDE 500 ML: 9 INJECTION, SOLUTION INTRAVENOUS at 10:43

## 2024-08-21 RX ADMIN — LIDOCAINE HYDROCHLORIDE 100 MG: 20 INJECTION, SOLUTION EPIDURAL; INFILTRATION; INTRACAUDAL; PERINEURAL at 11:04

## 2024-08-21 NOTE — ANESTHESIA POSTPROCEDURE EVALUATION
"Patient: Radhika Bryant    Procedure Summary       Date: 08/21/24 Room / Location: [facility] ENDOSCOPY 2 / [facility] ENDOSCOPY    Anesthesia Start: 1101 Anesthesia Stop: 1114    Procedure: ESOPHAGOGASTRODUODENOSCOPY WITH ANESTHESIA Diagnosis:       Nausea      Gastroesophageal reflux disease, unspecified whether esophagitis present      (Nausea [R11.0])      (Gastroesophageal reflux disease, unspecified whether esophagitis present [K21.9])    Surgeons: Lisa Rosales MD Provider: Brooklyn Wiggins CRNA    Anesthesia Type: MAC ASA Status: 3            Anesthesia Type: MAC    Vitals  Vitals Value Taken Time   /72 08/21/24 1131   Temp     Pulse 94 08/21/24 1135   Resp 21 08/21/24 1130   SpO2 93 % 08/21/24 1135   Vitals shown include unfiled device data.        Post Anesthesia Care and Evaluation    Patient location during evaluation: PACU  Patient participation: complete - patient participated  Level of consciousness: awake and awake and alert  Pain score: 0  Pain management: adequate    Airway patency: patent  Anesthetic complications: No anesthetic complications  PONV Status: none  Cardiovascular status: acceptable  Respiratory status: acceptable  Hydration status: acceptable    Comments: Patient discharged according to acceptable Tejal score per RN assessment. See nursing records for further information.     Blood pressure 102/72, pulse 93, temperature 97.4 °F (36.3 °C), temperature source Temporal, resp. rate 21, height 152.4 cm (60\"), weight 98 kg (216 lb), SpO2 95%, not currently breastfeeding.      "

## 2024-08-21 NOTE — ANESTHESIA PREPROCEDURE EVALUATION
Anesthesia Evaluation     Patient summary reviewed   no history of anesthetic complications:   NPO Solid Status: > 8 hours             Airway   Mallampati: II  Dental      Pulmonary    (+) asthma,  Cardiovascular - negative cardio ROS  Exercise tolerance: excellent (>7 METS)        Neuro/Psych- negative ROS  GI/Hepatic/Renal/Endo    (+) morbid obesity, GERD    Musculoskeletal     Abdominal    Substance History      OB/GYN          Other                    Anesthesia Plan    ASA 3     MAC       Anesthetic plan, risks, benefits, and alternatives have been provided, discussed and informed consent has been obtained with: patient.    CODE STATUS:

## 2024-08-21 NOTE — DISCHARGE INSTRUCTIONS
THE HOSPITAL SCHEDULING DEPARTMENT WILL NOTIFY PT WITH APPOINTMENT DATE AND TIME FOR A SONO OF THE GALLBLADDER AND HIDA SCAN

## 2024-08-21 NOTE — H&P
Chief Complaint:   Nausea    Subjective     HPI:   She has complaints of nausea that has not improved with the addition of Prilosec 40 mg daily.  GBIS.  Previously followed by another GI practice and has been told that she has IBS.    Past Medical History:   Past Medical History:   Diagnosis Date    Asthma     IBS (irritable bowel syndrome)        Past Surgical History:  Past Surgical History:   Procedure Laterality Date    WISDOM TOOTH EXTRACTION         Family History:  Family History   Problem Relation Age of Onset    Diabetes Father     Irritable bowel syndrome Father     Diabetes Maternal Grandmother     Diabetes Paternal Grandmother     Diabetes Paternal Uncle     Diabetes Paternal Uncle     Colon cancer Neg Hx     Colon polyps Neg Hx     Esophageal cancer Neg Hx     Liver cancer Neg Hx     Stomach cancer Neg Hx     Rectal cancer Neg Hx     Liver disease Neg Hx        Social History:   reports that she has never smoked. She has never used smokeless tobacco. She reports that she does not drink alcohol and does not use drugs.    Medications:   Medications Prior to Admission   Medication Sig Dispense Refill Last Dose    Azelastine-Fluticasone 137-50 MCG/ACT suspension Inhale 1 spray Daily.   8/20/2024    dicyclomine (BENTYL) 10 MG capsule Take 1 capsule by mouth 4 (Four) Times a Day Before Meals & at Bedtime. 360 capsule 3 8/20/2024    fexofenadine (Allegra Allergy) 180 MG tablet Take 1 tablet by mouth Daily.   8/20/2024    levonorgestrel (Mirena, 52 MG,) 20 MCG/24HR IUD 1 each by Intrauterine route 1 (One) Time for 1 dose. 1 each 0 8/21/2024    omeprazole (priLOSEC) 40 MG capsule Take 1 capsule by mouth Daily. 30 capsule 1 8/20/2024    sulfamethoxazole-trimethoprim (Bactrim DS) 800-160 MG per tablet Take 1 tablet by mouth 2 (Two) Times a Day. (Patient not taking: Reported on 7/17/2024) 14 tablet 0     Wheat Dextrin (BENEFIBER PO) Take 1 capsule by mouth As Needed.   8/14/2024       Allergies:  Patient has no  "known allergies.    ROS:    Resp: No SOA  Cardiovascular: No CP      Objective     /85 (Patient Position: Sitting)   Pulse 112   Temp 97.4 °F (36.3 °C) (Temporal)   Resp 18   Ht 152.4 cm (60\")   Wt 98 kg (216 lb)   SpO2 99%   BMI 42.18 kg/m²     Physical Exam   Constitutional: Pt is oriented to person, place, and in no distress.   Pulmonary/Chest: No distress.  No audible wheezes  Psychiatric: Mood, memory, affect and judgment appear normal.     Assessment & Plan     Diagnosis:  Nausea    Anticipated Surgical Procedure:  Endoscopy    The risks, benefits, and alternatives of endoscopy were reviewed with the patient today.  Risks including perforation, with or without dilation, possibly requiring surgery.  Additional risks include risk of bleeding.  There is also the risk of a drug reaction or problems with anesthesia.  This will be discussed with the further by the anesthesia team on the day of the procedure. The benefits include the diagnosis and management of disease of the upper digestive tract.  Alternatives to endoscopy include upper GI series, laboratory testing, radiographic evaluation, or no intervention.  The patient verbalizes understanding and agrees.    Please note that portions of this note were completed with a voice recognition program.       "

## 2024-08-30 ENCOUNTER — HOSPITAL ENCOUNTER (OUTPATIENT)
Dept: ULTRASOUND IMAGING | Facility: HOSPITAL | Age: 19
Discharge: HOME | End: 2024-08-30
Admitting: INTERNAL MEDICINE

## 2024-08-30 DIAGNOSIS — R11.0 NAUSEA: ICD-10-CM

## 2024-08-30 DIAGNOSIS — R10.13 EPIGASTRIC PAIN: ICD-10-CM

## 2024-08-30 PROBLEM — K76.0 FATTY LIVER: Status: ACTIVE | Noted: 2024-08-30

## 2024-08-30 PROCEDURE — 76705 ECHO EXAM OF ABDOMEN: CPT

## 2024-09-13 ENCOUNTER — HOSPITAL ENCOUNTER (OUTPATIENT)
Dept: NUCLEAR MEDICINE | Facility: HOSPITAL | Age: 19
Discharge: HOME | End: 2024-09-13

## 2024-09-13 DIAGNOSIS — R10.13 EPIGASTRIC PAIN: ICD-10-CM

## 2024-09-13 DIAGNOSIS — R11.0 NAUSEA: ICD-10-CM

## 2024-09-13 PROCEDURE — 78226 HEPATOBILIARY SYSTEM IMAGING: CPT

## 2024-09-13 PROCEDURE — A9537 TC99M MEBROFENIN: HCPCS | Performed by: INTERNAL MEDICINE

## 2024-09-13 PROCEDURE — 0 TECHNETIUM TC 99M MEBROFENIN KIT: Performed by: INTERNAL MEDICINE

## 2024-09-13 RX ORDER — KIT FOR THE PREPARATION OF TECHNETIUM TC 99M MEBROFENIN 45 MG/10ML
1 INJECTION, POWDER, LYOPHILIZED, FOR SOLUTION INTRAVENOUS
Status: COMPLETED | OUTPATIENT
Start: 2024-09-13 | End: 2024-09-13

## 2024-09-13 RX ADMIN — MEBROFENIN 1 DOSE: 45 INJECTION, POWDER, LYOPHILIZED, FOR SOLUTION INTRAVENOUS at 08:45

## (undated) DEVICE — YANKAUER,BULB TIP WITH VENT: Brand: ARGYLE

## (undated) DEVICE — THE SINGLE USE ETRAP – POLYP TRAP IS USED FOR SUCTION RETRIEVAL OF ENDOSCOPICALLY REMOVED POLYPS.: Brand: ETRAP

## (undated) DEVICE — SNAR POLYP CAPTIVATOR RND STFF 2.4 240CM 10MM 1P/U

## (undated) DEVICE — Device: Brand: DEFENDO AIR/WATER/SUCTION AND BIOPSY VALVE

## (undated) DEVICE — CUFF,BP,DISP,1 TUBE,ADULT,HP: Brand: MEDLINE

## (undated) DEVICE — THE CHANNEL CLEANING BRUSH IS A NYLON FLEXI BRUSH ATTACHED TO A FLEXIBLE PLASTIC SHEATH DESIGNED TO SAFELY REMOVE DEBRIS FROM FLEXIBLE ENDOSCOPES.

## (undated) DEVICE — CONMED SCOPE SAVER BITE BLOCK, 20X27 MM: Brand: SCOPE SAVER